# Patient Record
Sex: FEMALE | Race: WHITE | Employment: PART TIME | URBAN - METROPOLITAN AREA
[De-identification: names, ages, dates, MRNs, and addresses within clinical notes are randomized per-mention and may not be internally consistent; named-entity substitution may affect disease eponyms.]

---

## 2019-02-22 PROBLEM — Z34.90 TERM PREGNANCY: Status: ACTIVE | Noted: 2019-02-22

## 2020-02-27 ENCOUNTER — OFFICE VISIT (OUTPATIENT)
Dept: FAMILY MEDICINE CLINIC | Age: 30
End: 2020-02-27

## 2020-02-27 ENCOUNTER — HOSPITAL ENCOUNTER (OUTPATIENT)
Dept: LAB | Age: 30
Discharge: HOME | End: 2020-02-27
Payer: OTHER GOVERNMENT

## 2020-02-27 VITALS
HEART RATE: 73 BPM | SYSTOLIC BLOOD PRESSURE: 128 MMHG | HEIGHT: 69 IN | BODY MASS INDEX: 20.56 KG/M2 | WEIGHT: 138.8 LBS | TEMPERATURE: 98.3 F | OXYGEN SATURATION: 100 % | RESPIRATION RATE: 16 BRPM | DIASTOLIC BLOOD PRESSURE: 80 MMHG

## 2020-02-27 DIAGNOSIS — R00.2 PALPITATIONS: ICD-10-CM

## 2020-02-27 DIAGNOSIS — R79.89 ELEVATED TSH: ICD-10-CM

## 2020-02-27 DIAGNOSIS — R79.89 ELEVATED TSH: Primary | ICD-10-CM

## 2020-02-27 LAB
T4 FREE SERPL-MCNC: 1 NG/DL (ref 0.7–1.5)
TSH SERPL DL<=0.05 MIU/L-ACNC: 2.22 UIU/ML (ref 0.36–3.74)

## 2020-02-27 PROCEDURE — 36415 COLL VENOUS BLD VENIPUNCTURE: CPT

## 2020-02-27 PROCEDURE — 84480 ASSAY TRIIODOTHYRONINE (T3): CPT

## 2020-02-27 PROCEDURE — 84443 ASSAY THYROID STIM HORMONE: CPT

## 2020-02-27 PROCEDURE — 86376 MICROSOMAL ANTIBODY EACH: CPT

## 2020-02-27 PROCEDURE — 84439 ASSAY OF FREE THYROXINE: CPT

## 2020-02-27 NOTE — PROGRESS NOTES
Pt is here for establish care. Pt had abnormal TSH at the ED     1. Have you been to the ER, urgent care clinic since your last visit? Hospitalized since your last visit? Yes 2/10/20 [facility] ED Palpitations, Elevated TSH    2. Have you seen or consulted any other health care providers outside of the [address] [place] [address] since your last visit? Include any pap smears or colon screening.  No

## 2020-02-27 NOTE — PROGRESS NOTES
HISTORY OF PRESENT ILLNESS  Mag Hernandez is a 34 y.o. female. Patient presents to establish care. HPI  Pt had an abnormal TSH in the ED. It was 7.37  Pt was seen in the [facility] ED on 2-  There is no family history of thyroid problems. Pt denies difficulties swallowing. Pt notes palpitations worsen during exercise. Pt was also ref to cardiology    She did have a miscarriage, otherwise no other symptoms except for the palpitations. Review of Systems   Constitutional: Negative. Eyes: Negative. Respiratory: Negative. Cardiovascular: Positive for palpitations. Neurological: Negative. Visit Vitals  /80 (BP 1 Location: Left arm)   Pulse 73   Temp 98.3 °F (36.8 °C) (Oral)   Resp 16   Ht 5' 9.49\" (1.765 m)   Wt 138 lb 12.8 oz (63 kg)   LMP 01/16/2020 (Exact Date)   SpO2 100%   Breastfeeding No Comment: Miscarriage 1/16/20   BMI 20.21 kg/m²       Physical Exam  Constitutional:       General: She is not in acute distress. Appearance: Normal appearance. She is normal weight. She is not ill-appearing. Eyes:      Extraocular Movements: Extraocular movements intact. Pupils: Pupils are equal, round, and reactive to light. Neck:      Musculoskeletal: Normal range of motion and neck supple. No neck rigidity or muscular tenderness. Vascular: No carotid bruit. Cardiovascular:      Rate and Rhythm: Normal rate and regular rhythm. Heart sounds: No murmur. Pulmonary:      Effort: Pulmonary effort is normal. No respiratory distress. Breath sounds: Normal breath sounds. No stridor. No wheezing, rhonchi or rales. Lymphadenopathy:      Cervical: No cervical adenopathy. Neurological:      Mental Status: She is alert and oriented to person, place, and time. ASSESSMENT and PLAN    ICD-10-CM ICD-9-CM    1. Elevated TSH R79.89 794.5 TSH 3RD GENERATION      T4, FREE      THYROID ANTIBODY PANEL      T3 TOTAL   2.  Palpitations R00.2 785.1      PLAN:  I reviewed ED notes and labs. Pt will follow up with cardiology . Next step pending labs. I have discussed the diagnosis with the patient and the intended plan as seen in the above orders. The patient has received an after-visit summary and questions were answered concerning future plans. I have discussed medication side effects and warnings with the patient as well. Patient will call for further questions.

## 2020-02-29 LAB
T3 SERPL-MCNC: 102 NG/DL (ref 71–180)
THYROGLOB AB SERPL-ACNC: <1 IU/ML (ref 0–0.9)
THYROPEROXIDASE AB SERPL-ACNC: 8 IU/ML (ref 0–34)